# Patient Record
Sex: FEMALE | Race: WHITE | NOT HISPANIC OR LATINO
[De-identification: names, ages, dates, MRNs, and addresses within clinical notes are randomized per-mention and may not be internally consistent; named-entity substitution may affect disease eponyms.]

---

## 2018-04-25 ENCOUNTER — APPOINTMENT (OUTPATIENT)
Dept: CARDIOLOGY | Facility: CLINIC | Age: 83
End: 2018-04-25
Payer: MEDICARE

## 2018-04-25 ENCOUNTER — NON-APPOINTMENT (OUTPATIENT)
Age: 83
End: 2018-04-25

## 2018-04-25 VITALS
HEIGHT: 61 IN | SYSTOLIC BLOOD PRESSURE: 160 MMHG | OXYGEN SATURATION: 97 % | HEART RATE: 56 BPM | DIASTOLIC BLOOD PRESSURE: 80 MMHG | WEIGHT: 173 LBS | BODY MASS INDEX: 32.66 KG/M2

## 2018-04-25 DIAGNOSIS — R00.2 PALPITATIONS: ICD-10-CM

## 2018-04-25 DIAGNOSIS — I65.29 OCCLUSION AND STENOSIS OF UNSPECIFIED CAROTID ARTERY: ICD-10-CM

## 2018-04-25 DIAGNOSIS — R73.9 HYPERGLYCEMIA, UNSPECIFIED: ICD-10-CM

## 2018-04-25 DIAGNOSIS — E78.5 HYPERLIPIDEMIA, UNSPECIFIED: ICD-10-CM

## 2018-04-25 DIAGNOSIS — Z01.818 ENCOUNTER FOR OTHER PREPROCEDURAL EXAMINATION: ICD-10-CM

## 2018-04-25 DIAGNOSIS — I34.0 NONRHEUMATIC MITRAL (VALVE) INSUFFICIENCY: ICD-10-CM

## 2018-04-25 PROCEDURE — 99215 OFFICE O/P EST HI 40 MIN: CPT

## 2018-04-25 PROCEDURE — 93000 ELECTROCARDIOGRAM COMPLETE: CPT

## 2018-04-25 RX ORDER — HYDROCHLOROTHIAZIDE 12.5 MG/1
12.5 TABLET ORAL DAILY
Qty: 30 | Refills: 3 | Status: ACTIVE | COMMUNITY

## 2018-04-27 ENCOUNTER — APPOINTMENT (OUTPATIENT)
Dept: CARDIOLOGY | Facility: CLINIC | Age: 83
End: 2018-04-27
Payer: MEDICARE

## 2018-04-27 PROCEDURE — 93880 EXTRACRANIAL BILAT STUDY: CPT

## 2018-05-02 ENCOUNTER — EMERGENCY (EMERGENCY)
Facility: HOSPITAL | Age: 83
LOS: 1 days | Discharge: HOME | End: 2018-05-02
Attending: EMERGENCY MEDICINE | Admitting: EMERGENCY MEDICINE
Payer: MEDICARE

## 2018-05-02 VITALS
OXYGEN SATURATION: 97 % | DIASTOLIC BLOOD PRESSURE: 60 MMHG | WEIGHT: 169.98 LBS | HEART RATE: 86 BPM | RESPIRATION RATE: 18 BRPM | SYSTOLIC BLOOD PRESSURE: 150 MMHG | TEMPERATURE: 99 F | HEIGHT: 61 IN

## 2018-05-02 VITALS
TEMPERATURE: 99 F | HEART RATE: 63 BPM | DIASTOLIC BLOOD PRESSURE: 76 MMHG | OXYGEN SATURATION: 98 % | SYSTOLIC BLOOD PRESSURE: 143 MMHG | RESPIRATION RATE: 17 BRPM

## 2018-05-02 LAB
ALBUMIN SERPL ELPH-MCNC: 3.5 G/DL — SIGNIFICANT CHANGE UP (ref 3.3–5)
ALP SERPL-CCNC: 64 U/L — SIGNIFICANT CHANGE UP (ref 40–120)
ALT FLD-CCNC: 26 U/L — SIGNIFICANT CHANGE UP (ref 12–78)
AMYLASE P1 CFR SERPL: 56 U/L — SIGNIFICANT CHANGE UP (ref 25–115)
ANION GAP SERPL CALC-SCNC: 12 MMOL/L — SIGNIFICANT CHANGE UP (ref 5–17)
APPEARANCE UR: CLEAR — SIGNIFICANT CHANGE UP
APTT BLD: 24.7 SEC — LOW (ref 27.5–37.4)
AST SERPL-CCNC: 22 U/L — SIGNIFICANT CHANGE UP (ref 15–37)
BACTERIA # UR AUTO: NEGATIVE — SIGNIFICANT CHANGE UP
BASOPHILS NFR BLD AUTO: 1 % — SIGNIFICANT CHANGE UP (ref 0–2)
BILIRUB SERPL-MCNC: 0.5 MG/DL — SIGNIFICANT CHANGE UP (ref 0.2–1.2)
BILIRUB UR-MCNC: NEGATIVE — SIGNIFICANT CHANGE UP
BUN SERPL-MCNC: 28 MG/DL — HIGH (ref 7–23)
CALCIUM SERPL-MCNC: 9 MG/DL — SIGNIFICANT CHANGE UP (ref 8.5–10.1)
CHLORIDE SERPL-SCNC: 103 MMOL/L — SIGNIFICANT CHANGE UP (ref 96–108)
CO2 SERPL-SCNC: 24 MMOL/L — SIGNIFICANT CHANGE UP (ref 22–31)
COLOR SPEC: YELLOW — SIGNIFICANT CHANGE UP
CREAT SERPL-MCNC: 0.99 MG/DL — SIGNIFICANT CHANGE UP (ref 0.5–1.3)
DIFF PNL FLD: NEGATIVE — SIGNIFICANT CHANGE UP
EPI CELLS # UR: SIGNIFICANT CHANGE UP
GLUCOSE SERPL-MCNC: 151 MG/DL — HIGH (ref 70–99)
GLUCOSE UR QL: NEGATIVE — SIGNIFICANT CHANGE UP
HCT VFR BLD CALC: 40.8 % — SIGNIFICANT CHANGE UP (ref 34.5–45)
HCT VFR BLD CALC: 43.4 % — SIGNIFICANT CHANGE UP (ref 34.5–45)
HGB BLD-MCNC: 13.5 G/DL — SIGNIFICANT CHANGE UP (ref 11.5–15.5)
HGB BLD-MCNC: 14.8 G/DL — SIGNIFICANT CHANGE UP (ref 11.5–15.5)
INR BLD: 1.01 RATIO — SIGNIFICANT CHANGE UP (ref 0.88–1.16)
KETONES UR-MCNC: NEGATIVE — SIGNIFICANT CHANGE UP
LEUKOCYTE ESTERASE UR-ACNC: NEGATIVE — SIGNIFICANT CHANGE UP
LIDOCAIN IGE QN: 175 U/L — SIGNIFICANT CHANGE UP (ref 73–393)
LYMPHOCYTES # BLD AUTO: 16 % — SIGNIFICANT CHANGE UP (ref 13–44)
LYMPHOCYTES # BLD AUTO: 2 % — LOW (ref 13–44)
MCHC RBC-ENTMCNC: 32.9 PG — SIGNIFICANT CHANGE UP (ref 27–34)
MCHC RBC-ENTMCNC: 33.1 GM/DL — SIGNIFICANT CHANGE UP (ref 32–36)
MCHC RBC-ENTMCNC: 33.9 PG — SIGNIFICANT CHANGE UP (ref 27–34)
MCHC RBC-ENTMCNC: 34 GM/DL — SIGNIFICANT CHANGE UP (ref 32–36)
MCV RBC AUTO: 99.4 FL — SIGNIFICANT CHANGE UP (ref 80–100)
MCV RBC AUTO: 99.7 FL — SIGNIFICANT CHANGE UP (ref 80–100)
MONOCYTES NFR BLD AUTO: 2 % — SIGNIFICANT CHANGE UP (ref 1–9)
MONOCYTES NFR BLD AUTO: 9 % — SIGNIFICANT CHANGE UP (ref 1–9)
NEUTROPHILS NFR BLD AUTO: 64 % — SIGNIFICANT CHANGE UP (ref 43–77)
NEUTROPHILS NFR BLD AUTO: 83 % — HIGH (ref 43–77)
NEUTS BAND # BLD: 10 % — HIGH (ref 0–8)
NEUTS BAND # BLD: 7 % — SIGNIFICANT CHANGE UP (ref 0–8)
NITRITE UR-MCNC: NEGATIVE — SIGNIFICANT CHANGE UP
PH UR: 5 — SIGNIFICANT CHANGE UP (ref 5–8)
PLAT MORPH BLD: NORMAL — SIGNIFICANT CHANGE UP
PLAT MORPH BLD: NORMAL — SIGNIFICANT CHANGE UP
PLATELET # BLD AUTO: 268 K/UL — SIGNIFICANT CHANGE UP (ref 150–400)
PLATELET # BLD AUTO: 306 K/UL — SIGNIFICANT CHANGE UP (ref 150–400)
POTASSIUM SERPL-MCNC: 3.9 MMOL/L — SIGNIFICANT CHANGE UP (ref 3.5–5.3)
POTASSIUM SERPL-SCNC: 3.9 MMOL/L — SIGNIFICANT CHANGE UP (ref 3.5–5.3)
PROT SERPL-MCNC: 7.1 G/DL — SIGNIFICANT CHANGE UP (ref 6–8.3)
PROT UR-MCNC: NEGATIVE — SIGNIFICANT CHANGE UP
PROTHROM AB SERPL-ACNC: 11 SEC — SIGNIFICANT CHANGE UP (ref 9.8–12.7)
RBC # BLD: 4.1 M/UL — SIGNIFICANT CHANGE UP (ref 3.8–5.2)
RBC # BLD: 4.35 M/UL — SIGNIFICANT CHANGE UP (ref 3.8–5.2)
RBC # FLD: 11.1 % — SIGNIFICANT CHANGE UP (ref 10.3–14.5)
RBC # FLD: 11.6 % — SIGNIFICANT CHANGE UP (ref 10.3–14.5)
RBC BLD AUTO: NORMAL — SIGNIFICANT CHANGE UP
RBC BLD AUTO: NORMAL — SIGNIFICANT CHANGE UP
RBC CASTS # UR COMP ASSIST: SIGNIFICANT CHANGE UP /HPF (ref 0–4)
SODIUM SERPL-SCNC: 139 MMOL/L — SIGNIFICANT CHANGE UP (ref 135–145)
SP GR SPEC: 1.01 — SIGNIFICANT CHANGE UP (ref 1.01–1.02)
UROBILINOGEN FLD QL: NEGATIVE — SIGNIFICANT CHANGE UP
VARIANT LYMPHS # BLD: 2 % — SIGNIFICANT CHANGE UP (ref 0–6)
VARIANT LYMPHS # BLD: 4 % — SIGNIFICANT CHANGE UP (ref 0–6)
WBC # BLD: 13.4 K/UL — HIGH (ref 3.8–10.5)
WBC # BLD: 17.1 K/UL — HIGH (ref 3.8–10.5)
WBC # FLD AUTO: 13.4 K/UL — HIGH (ref 3.8–10.5)
WBC # FLD AUTO: 17.1 K/UL — HIGH (ref 3.8–10.5)
WBC UR QL: SIGNIFICANT CHANGE UP

## 2018-05-02 PROCEDURE — 96360 HYDRATION IV INFUSION INIT: CPT | Mod: XU

## 2018-05-02 PROCEDURE — 71045 X-RAY EXAM CHEST 1 VIEW: CPT

## 2018-05-02 PROCEDURE — 83690 ASSAY OF LIPASE: CPT

## 2018-05-02 PROCEDURE — 82550 ASSAY OF CK (CPK): CPT

## 2018-05-02 PROCEDURE — 80053 COMPREHEN METABOLIC PANEL: CPT

## 2018-05-02 PROCEDURE — 81001 URINALYSIS AUTO W/SCOPE: CPT

## 2018-05-02 PROCEDURE — 71045 X-RAY EXAM CHEST 1 VIEW: CPT | Mod: 26

## 2018-05-02 PROCEDURE — 96361 HYDRATE IV INFUSION ADD-ON: CPT

## 2018-05-02 PROCEDURE — 85610 PROTHROMBIN TIME: CPT

## 2018-05-02 PROCEDURE — 82553 CREATINE MB FRACTION: CPT

## 2018-05-02 PROCEDURE — 99285 EMERGENCY DEPT VISIT HI MDM: CPT

## 2018-05-02 PROCEDURE — 99284 EMERGENCY DEPT VISIT MOD MDM: CPT | Mod: 25

## 2018-05-02 PROCEDURE — 84484 ASSAY OF TROPONIN QUANT: CPT

## 2018-05-02 PROCEDURE — 85730 THROMBOPLASTIN TIME PARTIAL: CPT

## 2018-05-02 PROCEDURE — 85027 COMPLETE CBC AUTOMATED: CPT

## 2018-05-02 PROCEDURE — 74177 CT ABD & PELVIS W/CONTRAST: CPT

## 2018-05-02 PROCEDURE — 74177 CT ABD & PELVIS W/CONTRAST: CPT | Mod: 26

## 2018-05-02 PROCEDURE — 87086 URINE CULTURE/COLONY COUNT: CPT

## 2018-05-02 PROCEDURE — 93005 ELECTROCARDIOGRAM TRACING: CPT

## 2018-05-02 PROCEDURE — 82150 ASSAY OF AMYLASE: CPT

## 2018-05-02 RX ORDER — LEVOTHYROXINE SODIUM 125 MCG
1 TABLET ORAL
Qty: 0 | Refills: 0 | COMMUNITY

## 2018-05-02 RX ORDER — SODIUM CHLORIDE 9 MG/ML
1000 INJECTION INTRAMUSCULAR; INTRAVENOUS; SUBCUTANEOUS ONCE
Qty: 0 | Refills: 0 | Status: COMPLETED | OUTPATIENT
Start: 2018-05-02 | End: 2018-05-02

## 2018-05-02 RX ORDER — EZETIMIBE 10 MG/1
1 TABLET ORAL
Qty: 0 | Refills: 0 | COMMUNITY

## 2018-05-02 RX ORDER — ROSUVASTATIN CALCIUM 5 MG/1
2.5 TABLET ORAL
Qty: 0 | Refills: 0 | COMMUNITY

## 2018-05-02 RX ORDER — ATENOLOL 25 MG/1
1 TABLET ORAL
Qty: 0 | Refills: 0 | COMMUNITY

## 2018-05-02 RX ORDER — ASPIRIN/CALCIUM CARB/MAGNESIUM 324 MG
1 TABLET ORAL
Qty: 0 | Refills: 0 | COMMUNITY

## 2018-05-02 RX ORDER — RAMIPRIL 5 MG
1 CAPSULE ORAL
Qty: 0 | Refills: 0 | COMMUNITY

## 2018-05-02 RX ORDER — IOHEXOL 300 MG/ML
30 INJECTION, SOLUTION INTRAVENOUS ONCE
Qty: 0 | Refills: 0 | Status: COMPLETED | OUTPATIENT
Start: 2018-05-02 | End: 2018-05-02

## 2018-05-02 RX ADMIN — SODIUM CHLORIDE 1000 MILLILITER(S): 9 INJECTION INTRAMUSCULAR; INTRAVENOUS; SUBCUTANEOUS at 13:34

## 2018-05-02 RX ADMIN — IOHEXOL 30 MILLILITER(S): 300 INJECTION, SOLUTION INTRAVENOUS at 09:49

## 2018-05-02 RX ADMIN — SODIUM CHLORIDE 1000 MILLILITER(S): 9 INJECTION INTRAMUSCULAR; INTRAVENOUS; SUBCUTANEOUS at 09:43

## 2018-05-02 NOTE — ED PROVIDER NOTE - PROGRESS NOTE DETAILS
jena (jalen) cleared pt for d/c and outpt f/u. Reevaluated patient at bedside.  Patient feeling well.  Discussed the results of all diagnostic testing in ED and copies of all reports given.   An opportunity to ask questions was given.  Discussed the importance of prompt, close medical follow-up.  Patient will return with any changes, concerns or persistent / worsening symptoms.  Understanding of all instructions verbalized.

## 2018-05-02 NOTE — ED PROVIDER NOTE - OBJECTIVE STATEMENT
pt c/o nausea vomiting and diarrhea overnight, now resolved, and palpitations this am. pt was told by her cardiologist to come to er for palp. no fevers, chills, ha, dizziness, cp, sob, abd pain. no current symptoms.  travon - jaison paz

## 2018-05-02 NOTE — CONSULT NOTE ADULT - ASSESSMENT
83F w/pmh of Hypothyroid, HLD, HTN, hx of splenectomy presenting with palpitations which has resolved. Patient has had palpitations in the past which was worked up with Holter monitor without abnormal findings. Currently, asymptomatic. Found to have leukocytosis.     - Unclear etiology. With multiple episodes of diarrhea, palpitations may be due to electrolyte imbalance.   - Follow up CT A/P  - No clear evidence of acute ischemia. Awaiting trops  - No evidence of volume overload   - No acute changes on EKG compared to previous  - BP well controlled, continue home BP meds, monitor routine hemodynamics  - Monitor and replete lytes, keep K>4, Mg>2  - Other cardiovascular workup will depend on clinical course.  - All other workup per primary team  - Will follow 83F w/pmh of Hypothyroid, HLD, HTN, hx of splenectomy presenting with palpitations which has resolved. Patient has had palpitations in the past which was worked up with Holter monitor without abnormal findings. Currently, asymptomatic. Found to have leukocytosis.     - Unclear etiology. With multiple episodes of diarrhea and vomiting, palpitations may be due to electrolyte imbalance.   - Follow up CT A/P  - No clear evidence of acute ischemia. Awaiting trops  - No evidence of volume overload   - EKG showed Normal sinus without ST elevations/depressions  - BP well controlled, continue home BP meds, monitor routine hemodynamics  - Monitor and replete lytes, keep K>4, Mg>2  - Other cardiovascular workup will depend on clinical course.  - All other workup per primary team  - Will follow 83F w/pmh of Hypothyroid, HLD, HTN, hx of splenectomy presenting with palpitations which has resolved. Patient has had palpitations in the past which was worked up with Holter monitor without abnormal findings. Currently, asymptomatic. Found to have leukocytosis.     - Unclear etiology. With multiple episodes of diarrhea and vomiting, palpitations may be due to electrolyte imbalance.   - Follow up CT A/P  - No clear evidence of acute ischemia. Awaiting trops  - No evidence of volume overload   - EKG showed Normal sinus without ST elevations/depressions  - BP mildly elevated, continue home BP meds, monitor routine hemodynamics  - Monitor and replete lytes, keep K>4, Mg>2  - Other cardiovascular workup will depend on clinical course.  - All other workup per primary team  - Will follow 83F w/pmh of Hypothyroid, HLD, HTN, hx of splenectomy presenting with palpitations which has resolved. Patient has had palpitations in the past which was worked up with Holter monitor without abnormal findings. Currently, asymptomatic. Found to have leukocytosis.     - Unclear etiology. With multiple episodes of diarrhea and vomiting, palpitations may be due to electrolyte imbalance.   - Follow up CT A/P  - No clear evidence of acute ischemia. Trops negative x1.   - No evidence of volume overload   - EKG showed Normal sinus without ST elevations/depressions  - BP mildly elevated, continue home BP meds, monitor routine hemodynamics  - Monitor and replete lytes, keep K>4, Mg>2  - Other cardiovascular workup will depend on clinical course.  - All other workup per primary team  - Will follow 83F w/pmh of Hypothyroid, HLD, HTN, hx of splenectomy presenting with palpitations which has resolved. Patient has had palpitations in the past which was worked up with Holter monitor without abnormal findings. Currently, asymptomatic. Found to have leukocytosis.     - Unclear etiology. With multiple episodes of diarrhea and vomiting, palpitations may be due to electrolyte imbalance.   - Follow up CT A/P  - No clear evidence of acute ischemia. Trops negative x1.   - No evidence of volume overload   - EKG showed Normal sinus without ST elevations/depressions  - BP mildly elevated likely 2/2 increased salt intake, continue home BP meds, monitor routine hemodynamics  - Monitor and replete lytes, keep K>4, Mg>2  - Other cardiovascular workup will depend on clinical course.  - All other workup per primary team  - Will follow 83F w/pmh of Hypothyroid, HLD, HTN, hx of splenectomy presenting with palpitations which has resolved. Patient has had palpitations in the past which was worked up with Holter monitor without abnormal findings. Currently, asymptomatic. Found to have leukocytosis.     - her palpitations are of unclear etiology. She does not describe a sudden onset or offset, or sustained very rapid rates, all of which would be more suggestive of a true arrhythia.  Overall I am more suspicious of a reactive sinus tach related to hermultiple episodes of diarrhea and vomiting   - she has been found to have a leukocytosis  - Follow up CT A/P  - No clear evidence of acute ischemia. Trops negative x1.   - No evidence of volume overload   - EKG now shows Normal sinus without ST elevations/depressions  - BP mildly elevated likely 2/2 increased salt intake, continue home BP meds, monitor routine hemodynamics  - Monitor and replete lytes, keep K>4, Mg>2  - Other cardiovascular workup will depend on clinical course.  - if no meaningful abdominal pathology would be reasonable to dc home and followup as an outpt for all cv complaints

## 2018-05-02 NOTE — ED ADULT NURSE NOTE - OBJECTIVE STATEMENT
pt with complaints of nausea, vomiting and diarrhea from 1 am to 4 am. pt currently has no nausea, vomiting or diarrhea, or abdominal pain, does not have palpitations now.

## 2018-05-02 NOTE — CONSULT NOTE ADULT - SUBJECTIVE AND OBJECTIVE BOX
[facility] - Antoine, Sabrina, Jameel, Jovany, Sawyer, Robin Pagan  Office Number: [phone]    Initial Consult Note    CHIEF COMPLAINT: Patient is a 83y old  Female who presents with a chief complaint of palpitations.    HPI: 83F w/pmh of Hypothyroid, HLD, HTN, hx of splenectomy presenting with palpitations this morning. States that palpitations lasted 15 minutes after waking up this morning. She has had palpitations similar to this before which her Cardiologist, Dr. Schultz is aware of. She wore a holter monitor for a month with normal findings and was told to come to ED if she ever had palpitations again. She recently saw Dr. Schultz 2 weeks ago and had carotid doppler, she is due for ECHO tomorrow and stress test. States that she had 7-8 episodes of nonbloody diarrhea and 5 episodes of nbnb vomiting last night after dinner. Reports that she was nauseous and took pepto bismol which upset her stomach more. Patient also reports having chills last night. She made homemade chicken soup and eggplant parm for dinner. No one else had similar symptoms after eating the same food. She does report eating chocolate from December. Currently, palpitations have resolved. Patient no longer has n/v, chest pain, palpitations, sob, abdominal pain.     PAST MEDICAL & SURGICAL HISTORY:  Diabetes  Hyperlipidemia  Hypothyroidism  Hypertension  History of Splenectomy    SOCIAL HISTORY:  No tobacco, ethanol, or drug abuse.    FAMILY HISTORY:    No family history of acute MI or sudden cardiac death.    MEDICATIONS  (STANDING): Synthroid, Atenolol, HCTZ, Ramipril, Crestor, Zetia, Asa 81mg,     Allergies: No Known Allergies    REVIEW OF SYSTEMS:  CONSTITUTIONAL: No weakness, fevers or chills  EYES/ENT: No visual changes;  No vertigo or throat pain   NECK: No pain or stiffness  RESPIRATORY: No cough, wheezing, hemoptysis; No shortness of breath  CARDIOVASCULAR: No chest pain or palpitations  GASTROINTESTINAL: No abdominal pain. No nausea, vomiting, or hematemesis; No diarrhea or constipation. No melena or hematochezia.  GENITOURINARY: No dysuria, frequency or hematuria  NEUROLOGICAL: No numbness or weakness  SKIN: No itching or rash  All other review of systems is negative unless indicated above    VITAL SIGNS:   Vital Signs Last 24 Hrs  T(C): 37 (02 May 2018 08:55), Max: 37 (02 May 2018 08:55)  T(F): 98.6 (02 May 2018 08:55), Max: 98.6 (02 May 2018 08:55)  HR: 86 (02 May 2018 08:55) (86 - 86)  BP: 150/60 (02 May 2018 08:55) (150/60 - 150/60)  RR: 18 (02 May 2018 08:55) (18 - 18)  SpO2: 97% (02 May 2018 08:55) (97% - 97%)    On Exam:  Constitutional: NAD, alert and oriented x 3  Lungs:  Non-labored, breath sounds are clear bilaterally, No wheezing, rales or rhonchi  Cardiovascular: RRR.  S1 and S2 positive.  No murmurs, rubs, gallops or clicks  Gastrointestinal: soft, nontender, nondistended, no guarding, no rebound, negative wong sign, negative CVA tenderness  Lymph: No peripheral edema. No cervical lymphadenopathy.  Neurological: Alert, no focal deficits  Skin: No rashes or ulcers   Psych:  Mood & affect appropriate.    LABS: All Labs Reviewed:                       14.8   17.1  )-----------( 306      ( 02 May 2018 09:26 )             43.4     02 May 2018 09:26    139    |  103    |  28     ----------------------------<  151    3.9     |  24     |  0.99     Ca    9.0        02 May 2018 09:26    TPro  7.1    /  Alb  3.5    /  TBili  0.5    /  DBili  x      /  AST  22     /  ALT  26     /  AlkPhos  64     02 May 2018 09:26    LIVER FUNCTIONS - ( 02 May 2018 09:26 )  Alb: 3.5 g/dL / Pro: 7.1 g/dL / ALK PHOS: 64 U/L / ALT: 26 U/L / AST: 22 U/L / GGT: x           RADIOLOGY:  < from: Xray Chest 1 View- PORTABLE-Routine (05.02.18 @ 09:38) >   No active disease.    EKG: NSR, HR 80, No ST elevation/depression [facility] - Antoine, Sabrina, Jameel, Jovany, Sawyer, Robin Pagan  Office Number: [phone]    Initial Consult Note    CHIEF COMPLAINT: Patient is a 83y old  Female who presents with a chief complaint of palpitations.    HPI: 83F w/pmh of Hypothyroid, HLD, HTN, hx of splenectomy presenting with palpitations this morning. States that palpitations lasted 15 minutes after waking up this morning. She has had palpitations similar to this before which her Cardiologist, Dr. Schultz is aware of. She wore a holter monitor for a month with normal findings and was told to come to ED if she ever had palpitations again. She recently saw Dr. Schultz 2 weeks ago and had carotid doppler, she is due for ECHO tomorrow and stress test. States that she had 7-8 episodes of nonbloody diarrhea and 5 episodes of nbnb vomiting last night after dinner. Reports that she was nauseous and took pepto bismol which upset her stomach more. Patient also reports having chills last night. She made homemade chicken soup and eggplant parm for dinner. No one else had similar symptoms after eating the same food. She does report eating chocolate from December. Currently, palpitations have resolved. Patient no longer has n/v, chest pain, palpitations, sob, abdominal pain. Of note, patient returned from [place] on 4/23.     PAST MEDICAL & SURGICAL HISTORY:  Diabetes  Hyperlipidemia  Hypothyroidism  Hypertension  History of Splenectomy    SOCIAL HISTORY:  No tobacco, ethanol, or drug abuse.    FAMILY HISTORY:    No family history of acute MI or sudden cardiac death.    MEDICATIONS  (STANDING): Synthroid, Atenolol, HCTZ, Ramipril, Crestor, Zetia, Asa 81mg,     Allergies: No Known Allergies    REVIEW OF SYSTEMS:  CONSTITUTIONAL: No weakness, fevers or chills  EYES/ENT: No visual changes;  No vertigo or throat pain   NECK: No pain or stiffness  RESPIRATORY: No cough, wheezing, hemoptysis; No shortness of breath  CARDIOVASCULAR: No chest pain or palpitations  GASTROINTESTINAL: No abdominal pain. No nausea, vomiting, or hematemesis; No diarrhea or constipation. No melena or hematochezia.  GENITOURINARY: No dysuria, frequency or hematuria  NEUROLOGICAL: No numbness or weakness  SKIN: No itching or rash  All other review of systems is negative unless indicated above    VITAL SIGNS:   Vital Signs Last 24 Hrs  T(C): 37 (02 May 2018 08:55), Max: 37 (02 May 2018 08:55)  T(F): 98.6 (02 May 2018 08:55), Max: 98.6 (02 May 2018 08:55)  HR: 86 (02 May 2018 08:55) (86 - 86)  BP: 150/60 (02 May 2018 08:55) (150/60 - 150/60)  RR: 18 (02 May 2018 08:55) (18 - 18)  SpO2: 97% (02 May 2018 08:55) (97% - 97%)    On Exam:  Constitutional: NAD, alert and oriented x 3  Lungs:  Non-labored, breath sounds are clear bilaterally, No wheezing, rales or rhonchi  Cardiovascular: RRR.  S1 and S2 positive.  No murmurs, rubs, gallops or clicks  Gastrointestinal: soft, nontender, nondistended, no guarding, no rebound, negative wong sign, negative CVA tenderness  Lymph: No peripheral edema. No cervical lymphadenopathy.  Neurological: Alert, no focal deficits  Skin: No rashes or ulcers   Psych:  Mood & affect appropriate.    LABS: All Labs Reviewed:                       14.8   17.1  )-----------( 306      ( 02 May 2018 09:26 )             43.4     02 May 2018 09:26    139    |  103    |  28     ----------------------------<  151    3.9     |  24     |  0.99     Ca    9.0        02 May 2018 09:26    TPro  7.1    /  Alb  3.5    /  TBili  0.5    /  DBili  x      /  AST  22     /  ALT  26     /  AlkPhos  64     02 May 2018 09:26    LIVER FUNCTIONS - ( 02 May 2018 09:26 )  Alb: 3.5 g/dL / Pro: 7.1 g/dL / ALK PHOS: 64 U/L / ALT: 26 U/L / AST: 22 U/L / GGT: x           RADIOLOGY:  < from: Xray Chest 1 View- PORTABLE-Routine (05.02.18 @ 09:38) >   No active disease.    EKG: NSR, HR 80, No ST elevation/depression [facility] - Sabrina Schultz, Jameel, Jovany, Sawyer, Robin Pagan  Office Number: [phone]    Initial Consult Note    CHIEF COMPLAINT: Patient is a 83y old  Female who presents with a chief complaint of palpitations.    HPI: 83F w/pmh of Hypothyroid, HLD, HTN, hx of splenectomy presenting with palpitations this morning. States that palpitations lasted 15 minutes after waking up this morning. She further describes these as a sense that her heart was beating more quickly than usual.  She took her bloodpressure a few times and found heart rates initially in the 110-115 range, and later in the 105-110 range.  She became concerned, and noted that she had been told to take such sxs seriously in the past.  She has had palpitations similar to this before which her Cardiologist, Dr. Schultz is aware of. She wore a monitor for a month with normal findings and was told to come to ED if she ever had palpitations again. She recently saw Dr. Schultz 2 weeks ago and had carotid doppler, she is due for ECHO tomorrow and stress test. States that she had 7-8 episodes of nonbloody diarrhea and 5 episodes of nbnb vomiting last night after dinner. Reports that she was nauseous and took pepto bismol which upset her stomach more. Patient also reports having chills last night. She made homemade chicken soup and eggplant parm for dinner. No one else had similar symptoms after eating the same food. She does report eating chocolate from December. Currently, palpitations have resolved. Patient no longer has n/v, chest pain, palpitations, sob, abdominal pain. Of note, patient returned from [place] on 4/23.     PAST MEDICAL & SURGICAL HISTORY:  Diabetes  Hyperlipidemia  Hypothyroidism  Hypertension  History of Splenectomy    SOCIAL HISTORY:  No tobacco, ethanol, or drug abuse.    FAMILY HISTORY:    No family history of acute MI or sudden cardiac death.    MEDICATIONS  (STANDING): Synthroid, Atenolol, HCTZ, Ramipril, Crestor, Zetia, Asa 81mg,     Allergies: No Known Allergies    REVIEW OF SYSTEMS:  CONSTITUTIONAL: No weakness, fevers or chills  EYES/ENT: No visual changes;  No vertigo or throat pain   NECK: No pain or stiffness  RESPIRATORY: No cough, wheezing, hemoptysis; No shortness of breath  CARDIOVASCULAR: No chest pain or palpitations  GASTROINTESTINAL: No abdominal pain. No nausea, vomiting, or hematemesis; No diarrhea or constipation. No melena or hematochezia.  GENITOURINARY: No dysuria, frequency or hematuria  NEUROLOGICAL: No numbness or weakness  SKIN: No itching or rash  All other review of systems is negative unless indicated above    VITAL SIGNS:   Vital Signs Last 24 Hrs  T(C): 37 (02 May 2018 08:55), Max: 37 (02 May 2018 08:55)  T(F): 98.6 (02 May 2018 08:55), Max: 98.6 (02 May 2018 08:55)  HR: 86 (02 May 2018 08:55) (86 - 86)  BP: 150/60 (02 May 2018 08:55) (150/60 - 150/60)  RR: 18 (02 May 2018 08:55) (18 - 18)  SpO2: 97% (02 May 2018 08:55) (97% - 97%)    On Exam:  Constitutional: NAD, alert and oriented x 3  Lungs:  Non-labored, breath sounds are clear bilaterally, No wheezing, rales or rhonchi  Cardiovascular: RRR.  S1 and S2 positive.  No murmurs, rubs, gallops or clicks  Gastrointestinal: soft, nontender, nondistended, no guarding, no rebound, negative wong sign, negative CVA tenderness  Lymph: No peripheral edema. No cervical lymphadenopathy.  Neurological: Alert, no focal deficits  Skin: No rashes or ulcers   Psych:  Mood & affect appropriate.    LABS: All Labs Reviewed:                       14.8   17.1  )-----------( 306      ( 02 May 2018 09:26 )             43.4     02 May 2018 09:26    139    |  103    |  28     ----------------------------<  151    3.9     |  24     |  0.99     Ca    9.0        02 May 2018 09:26    TPro  7.1    /  Alb  3.5    /  TBili  0.5    /  DBili  x      /  AST  22     /  ALT  26     /  AlkPhos  64     02 May 2018 09:26    LIVER FUNCTIONS - ( 02 May 2018 09:26 )  Alb: 3.5 g/dL / Pro: 7.1 g/dL / ALK PHOS: 64 U/L / ALT: 26 U/L / AST: 22 U/L / GGT: x           RADIOLOGY:  < from: Xray Chest 1 View- PORTABLE-Routine (05.02.18 @ 09:38) >   No active disease.    EKG: NSR, HR 80, No ST elevation/depression

## 2018-05-02 NOTE — CONSULT NOTE ADULT - ATTENDING COMMENTS
The patient was personally seen and examined, in addition to being examined and evaluated by housestaff.  All elements of the note were edited where appropriate.

## 2018-05-02 NOTE — ED ADULT NURSE NOTE - PMH
Diabetes    Hyperlipidemia    Hypertension    Hypothyroidism Diabetes    Diverticulitis    Hyperlipidemia    Hypertension    Hypothyroidism

## 2018-05-03 ENCOUNTER — APPOINTMENT (OUTPATIENT)
Dept: CARDIOLOGY | Facility: CLINIC | Age: 83
End: 2018-05-03
Payer: MEDICARE

## 2018-05-03 LAB
CULTURE RESULTS: SIGNIFICANT CHANGE UP
SPECIMEN SOURCE: SIGNIFICANT CHANGE UP

## 2018-05-03 PROCEDURE — 93306 TTE W/DOPPLER COMPLETE: CPT

## 2018-05-09 ENCOUNTER — APPOINTMENT (OUTPATIENT)
Dept: CARDIOLOGY | Facility: CLINIC | Age: 83
End: 2018-05-09
Payer: MEDICARE

## 2018-05-09 PROCEDURE — 78452 HT MUSCLE IMAGE SPECT MULT: CPT

## 2018-05-09 PROCEDURE — 93015 CV STRESS TEST SUPVJ I&R: CPT

## 2018-05-09 PROCEDURE — A9500: CPT

## 2018-05-11 ENCOUNTER — TRANSCRIPTION ENCOUNTER (OUTPATIENT)
Age: 83
End: 2018-05-11

## 2018-05-18 ENCOUNTER — OUTPATIENT (OUTPATIENT)
Dept: OUTPATIENT SERVICES | Facility: HOSPITAL | Age: 83
LOS: 1 days | End: 2018-05-18

## 2018-05-29 ENCOUNTER — INPATIENT (INPATIENT)
Facility: HOSPITAL | Age: 83
LOS: 2 days | Discharge: NURSING, REHAB OR LONG-TERM CARE | End: 2018-06-01
Payer: MEDICARE

## 2018-05-29 ENCOUNTER — OUTPATIENT (OUTPATIENT)
Dept: OUTPATIENT SERVICES | Facility: HOSPITAL | Age: 83
LOS: 1 days | End: 2018-05-29

## 2018-05-29 PROCEDURE — 73560 X-RAY EXAM OF KNEE 1 OR 2: CPT | Mod: 26,RT

## 2018-05-30 ENCOUNTER — OUTPATIENT (OUTPATIENT)
Dept: OUTPATIENT SERVICES | Facility: HOSPITAL | Age: 83
LOS: 1 days | End: 2018-05-30

## 2018-05-31 ENCOUNTER — OUTPATIENT (OUTPATIENT)
Dept: OUTPATIENT SERVICES | Facility: HOSPITAL | Age: 83
LOS: 1 days | End: 2018-05-31

## 2018-06-01 ENCOUNTER — OUTPATIENT (OUTPATIENT)
Dept: OUTPATIENT SERVICES | Facility: HOSPITAL | Age: 83
LOS: 1 days | End: 2018-06-01

## 2018-11-02 ENCOUNTER — TRANSCRIPTION ENCOUNTER (OUTPATIENT)
Age: 83
End: 2018-11-02

## 2019-11-22 ENCOUNTER — TRANSCRIPTION ENCOUNTER (OUTPATIENT)
Age: 84
End: 2019-11-22

## 2020-08-27 NOTE — ED PROVIDER NOTE - PRINCIPAL DIAGNOSIS
How Severe Is Your Skin Lesion?: mild
Has Your Skin Lesion Been Treated?: not been treated
Is This A New Presentation, Or A Follow-Up?: Skin Lesion
Palpitations

## 2022-01-23 PROBLEM — K57.92 DIVERTICULITIS OF INTESTINE, PART UNSPECIFIED, WITHOUT PERFORATION OR ABSCESS WITHOUT BLEEDING: Chronic | Status: ACTIVE | Noted: 2018-05-02

## 2023-05-03 ENCOUNTER — APPOINTMENT (OUTPATIENT)
Dept: CARDIOLOGY | Facility: CLINIC | Age: 88
End: 2023-05-03
Payer: MEDICARE

## 2023-05-03 ENCOUNTER — NON-APPOINTMENT (OUTPATIENT)
Age: 88
End: 2023-05-03

## 2023-05-03 VITALS
DIASTOLIC BLOOD PRESSURE: 73 MMHG | WEIGHT: 184 LBS | BODY MASS INDEX: 34.74 KG/M2 | SYSTOLIC BLOOD PRESSURE: 139 MMHG | HEIGHT: 61 IN | OXYGEN SATURATION: 96 % | HEART RATE: 56 BPM

## 2023-05-03 DIAGNOSIS — R00.2 PALPITATIONS: ICD-10-CM

## 2023-05-03 DIAGNOSIS — I10 ESSENTIAL (PRIMARY) HYPERTENSION: ICD-10-CM

## 2023-05-03 PROCEDURE — 93000 ELECTROCARDIOGRAM COMPLETE: CPT

## 2023-05-03 PROCEDURE — 99204 OFFICE O/P NEW MOD 45 MIN: CPT

## 2023-05-03 NOTE — HISTORY OF PRESENT ILLNESS
[FreeTextEntry1] : Mandy is a very pleasant 88-year-old female who has history of dyslipidemia, hypertension, osteoarthritis, prior hysterectomy and previous splenectomy after colonoscopy . \par \par She was last seen by Dr. Schultz in 2018, prior to a hip replacement.\par \par She had a full battery of cardiac testing in 2018.  A carotid Doppler demonstrated mild to moderate plaque in her left internal carotid artery.  An echocardiogram demonstrated normal left ventricular systolic function without significant valvular pathology.  A pharmacologic stress test showed no evidence of ischemia.\par \par Today, she is here to reestablish care.\par Overall, she is doing quite well.  She has no chest pain or dyspnea, and tries to stay active.  She does report palpitations, which occur at night, every 4 months or so, during which she feels that her heart is racing.  She recorded her blood pressure and heart rate once during the event, and her heart rate was in the 150 range.

## 2023-05-03 NOTE — DISCUSSION/SUMMARY
[FreeTextEntry1] : Mandy is doing quite well, and trying to stay active.  She reports palpitations which have been occurring every few months, and which sound to be an SVT based on description.  Her blood pressure is at goal today and physical exam is unremarkable.  EKG demonstrates sinus rhythm, with a single APC.\par \par We will start with a precautionary 2D echocardiogram to confirm the absence of structural heart disease.  She will be set up for a 2-week monitor, to elucidate the etiology of her symptoms.  She will have a carotid Doppler to evaluate her previously seen atherosclerosis.\par She will start taking her atenolol at night, to see if it helps with symptoms.\par \par We will speak after the above testing, and arrange follow-up [EKG obtained to assist in diagnosis and management of assessed problem(s)] : EKG obtained to assist in diagnosis and management of assessed problem(s)

## 2023-05-15 ENCOUNTER — APPOINTMENT (OUTPATIENT)
Dept: CARDIOLOGY | Facility: CLINIC | Age: 88
End: 2023-05-15
Payer: MEDICARE

## 2023-05-15 PROCEDURE — 93880 EXTRACRANIAL BILAT STUDY: CPT

## 2023-05-15 PROCEDURE — 93306 TTE W/DOPPLER COMPLETE: CPT

## 2023-08-30 ENCOUNTER — APPOINTMENT (OUTPATIENT)
Dept: OTOLARYNGOLOGY | Facility: CLINIC | Age: 88
End: 2023-08-30
Payer: MEDICARE

## 2023-08-30 ENCOUNTER — NON-APPOINTMENT (OUTPATIENT)
Age: 88
End: 2023-08-30

## 2023-08-30 VITALS
HEIGHT: 61 IN | BODY MASS INDEX: 34.93 KG/M2 | DIASTOLIC BLOOD PRESSURE: 74 MMHG | WEIGHT: 185 LBS | HEART RATE: 54 BPM | SYSTOLIC BLOOD PRESSURE: 147 MMHG

## 2023-08-30 DIAGNOSIS — R26.89 OTHER ABNORMALITIES OF GAIT AND MOBILITY: ICD-10-CM

## 2023-08-30 DIAGNOSIS — H93.13 TINNITUS, BILATERAL: ICD-10-CM

## 2023-08-30 DIAGNOSIS — Z87.19 PERSONAL HISTORY OF OTHER DISEASES OF THE DIGESTIVE SYSTEM: ICD-10-CM

## 2023-08-30 DIAGNOSIS — Z83.3 FAMILY HISTORY OF DIABETES MELLITUS: ICD-10-CM

## 2023-08-30 DIAGNOSIS — R42 DIZZINESS AND GIDDINESS: ICD-10-CM

## 2023-08-30 DIAGNOSIS — K21.9 GASTRO-ESOPHAGEAL REFLUX DISEASE W/OUT ESOPHAGITIS: ICD-10-CM

## 2023-08-30 DIAGNOSIS — Z82.49 FAMILY HISTORY OF ISCHEMIC HEART DISEASE AND OTHER DISEASES OF THE CIRCULATORY SYSTEM: ICD-10-CM

## 2023-08-30 DIAGNOSIS — H91.21 SUDDEN IDIOPATHIC HEARING LOSS, RIGHT EAR: ICD-10-CM

## 2023-08-30 PROCEDURE — 92550 TYMPANOMETRY & REFLEX THRESH: CPT

## 2023-08-30 PROCEDURE — 92557 COMPREHENSIVE HEARING TEST: CPT

## 2023-08-30 PROCEDURE — 99204 OFFICE O/P NEW MOD 45 MIN: CPT

## 2023-08-30 RX ORDER — AMLODIPINE BESYLATE 5 MG/1
TABLET ORAL
Refills: 0 | Status: ACTIVE | COMMUNITY

## 2023-08-30 RX ORDER — VIBEGRON 75 MG/1
TABLET, FILM COATED ORAL
Refills: 0 | Status: ACTIVE | COMMUNITY

## 2023-08-30 RX ORDER — CRANBERRY FRUIT EXTRACT 650 MG
CAPSULE ORAL
Refills: 0 | Status: ACTIVE | COMMUNITY

## 2023-08-30 NOTE — END OF VISIT
[FreeTextEntry3] : "I, Stevan Emery, personally scribed the services dictated to me by Dr. Farrukh Egan MD in this documentation on 08/30/2023 "   "I Dr. Farrukh Egan MD, personally performed the services described in this documentation on 08/30/2023 for the patient as scribed by Stevan Emery in my presence. I have reviewed and verified that all the information is accurate and true."

## 2023-08-30 NOTE — HISTORY OF PRESENT ILLNESS
[de-identified] : JADEN ESPINOZA is a 88 year old F who presents today for Hearing loss, ringing, and dizziness. Pt was on Pepcid recently.

## 2023-08-30 NOTE — CONSULT LETTER
[Dear  ___] : Dear  [unfilled], [Consult Closing:] : Thank you very much for allowing me to participate in the care of this patient.  If you have any questions, please do not hesitate to contact me. [FreeTextEntry3] : Farrukh Egan., MD

## 2023-08-30 NOTE — PHYSICAL EXAM
[Midline] : trachea located in midline position [Normal] : no nystagmus [] : Romberg test is negative [de-identified] : but still very unsteady

## 2023-08-30 NOTE — REVIEW OF SYSTEMS
[Hearing Loss] : hearing loss [Dizziness] : dizziness [Vertigo] : vertigo [Lightheadedness] : lightheadedness [Hoarseness] : hoarseness [Eyes Itch] : itching of the eyes [Palpitations] : palpitations [Heartburn] : heartburn [Joint Pain] : joint pain [Anxiety] : anxiety [Easy Bruising] : a tendency for easy bruising [Negative] : Endocrine [FreeTextEntry3] : watery [FreeTextEntry7] : difficulty swallowing, reflux  [FreeTextEntry1] : daytime sleepiness, feel cooler than others,

## 2023-08-30 NOTE — ASSESSMENT
[FreeTextEntry1] : Reviewed and reconciled medications, allergies, PMHx, PSHx, SocHx, FMHx.     physical exam:   No lateralization tinnitus  Oral: normal  No nystagmus  Negative head roll  negative Romberg but very unsteady   audio: -TYMPS: TYPE As AU -ESSENTIALLY MILD SLOPING TO SEVERE SNHL [id number] HZ AU    Plan:  VNG Audio - results interpreted by Farrukh Egan MD FACS and reviewed with the patient.

## 2023-09-15 ENCOUNTER — APPOINTMENT (OUTPATIENT)
Dept: CARDIOLOGY | Facility: CLINIC | Age: 88
End: 2023-09-15

## 2023-10-10 ENCOUNTER — APPOINTMENT (OUTPATIENT)
Dept: OTOLARYNGOLOGY | Facility: CLINIC | Age: 88
End: 2023-10-10
Payer: MEDICARE

## 2023-10-10 PROCEDURE — 92540 BASIC VESTIBULAR EVALUATION: CPT

## 2023-10-10 PROCEDURE — 92537 CALORIC VSTBLR TEST W/REC: CPT

## 2024-06-05 ENCOUNTER — NON-APPOINTMENT (OUTPATIENT)
Age: 89
End: 2024-06-05

## 2024-06-05 ENCOUNTER — OUTPATIENT (OUTPATIENT)
Dept: OUTPATIENT SERVICES | Facility: HOSPITAL | Age: 89
LOS: 1 days | End: 2024-06-05
Payer: MEDICARE

## 2024-06-05 ENCOUNTER — RESULT REVIEW (OUTPATIENT)
Age: 89
End: 2024-06-05

## 2024-06-05 DIAGNOSIS — R93.3 ABNORMAL FINDINGS ON DIAGNOSTIC IMAGING OF OTHER PARTS OF DIGESTIVE TRACT: ICD-10-CM

## 2024-06-05 PROCEDURE — 74220 X-RAY XM ESOPHAGUS 1CNTRST: CPT

## 2024-06-05 PROCEDURE — 92611 MOTION FLUOROSCOPY/SWALLOW: CPT

## 2024-06-05 PROCEDURE — 74230 X-RAY XM SWLNG FUNCJ C+: CPT

## 2024-06-05 PROCEDURE — 74230 X-RAY XM SWLNG FUNCJ C+: CPT | Mod: 26

## 2024-06-18 ENCOUNTER — APPOINTMENT (OUTPATIENT)
Dept: OTOLARYNGOLOGY | Facility: CLINIC | Age: 89
End: 2024-06-18
Payer: MEDICARE

## 2024-06-18 PROCEDURE — 92526 ORAL FUNCTION THERAPY: CPT | Mod: GN

## 2024-06-25 ENCOUNTER — APPOINTMENT (OUTPATIENT)
Dept: OTOLARYNGOLOGY | Facility: CLINIC | Age: 89
End: 2024-06-25
Payer: MEDICARE

## 2024-06-25 PROCEDURE — 92526 ORAL FUNCTION THERAPY: CPT | Mod: GN

## 2024-07-02 ENCOUNTER — APPOINTMENT (OUTPATIENT)
Dept: OTOLARYNGOLOGY | Facility: CLINIC | Age: 89
End: 2024-07-02
Payer: MEDICARE

## 2024-07-02 PROCEDURE — 92526 ORAL FUNCTION THERAPY: CPT | Mod: GN

## 2024-07-15 ENCOUNTER — TRANSCRIPTION ENCOUNTER (OUTPATIENT)
Age: 89
End: 2024-07-15

## 2024-07-16 ENCOUNTER — OUTPATIENT (OUTPATIENT)
Dept: OUTPATIENT SERVICES | Facility: HOSPITAL | Age: 89
LOS: 1 days | End: 2024-07-16
Payer: MEDICARE

## 2024-07-16 ENCOUNTER — RESULT REVIEW (OUTPATIENT)
Age: 89
End: 2024-07-16

## 2024-07-16 DIAGNOSIS — R13.10 DYSPHAGIA, UNSPECIFIED: ICD-10-CM

## 2024-07-16 PROCEDURE — 88305 TISSUE EXAM BY PATHOLOGIST: CPT | Mod: 26

## 2024-07-16 PROCEDURE — 88305 TISSUE EXAM BY PATHOLOGIST: CPT

## 2024-07-16 PROCEDURE — 88312 SPECIAL STAINS GROUP 1: CPT

## 2024-07-16 PROCEDURE — 88313 SPECIAL STAINS GROUP 2: CPT | Mod: 26

## 2024-07-16 PROCEDURE — 43239 EGD BIOPSY SINGLE/MULTIPLE: CPT

## 2024-07-16 PROCEDURE — 88312 SPECIAL STAINS GROUP 1: CPT | Mod: 26

## 2024-07-16 PROCEDURE — 88313 SPECIAL STAINS GROUP 2: CPT

## 2024-07-16 PROCEDURE — C1726: CPT

## 2024-07-16 DEVICE — ESOPHAGEAL BALLOON CATH CRE FIXED WIRE 18-19-20MM: Type: IMPLANTABLE DEVICE | Status: FUNCTIONAL

## 2024-07-16 DEVICE — HEMOSPRAY HEMOSTAT ENDO 7F: Type: IMPLANTABLE DEVICE | Status: FUNCTIONAL

## 2024-07-16 DEVICE — ESOPHAGEAL BALLOON CATH CRE FIXED WIRE 6-7-8MM: Type: IMPLANTABLE DEVICE | Status: FUNCTIONAL

## 2024-07-18 LAB — SURGICAL PATHOLOGY STUDY: SIGNIFICANT CHANGE UP

## 2024-09-20 ENCOUNTER — APPOINTMENT (OUTPATIENT)
Dept: CARDIOLOGY | Facility: CLINIC | Age: 89
End: 2024-09-20
Payer: MEDICARE

## 2024-09-20 ENCOUNTER — NON-APPOINTMENT (OUTPATIENT)
Age: 89
End: 2024-09-20

## 2024-09-20 VITALS
HEIGHT: 61 IN | BODY MASS INDEX: 35.12 KG/M2 | DIASTOLIC BLOOD PRESSURE: 71 MMHG | OXYGEN SATURATION: 96 % | WEIGHT: 186 LBS | HEART RATE: 62 BPM | SYSTOLIC BLOOD PRESSURE: 126 MMHG

## 2024-09-20 DIAGNOSIS — I34.0 NONRHEUMATIC MITRAL (VALVE) INSUFFICIENCY: ICD-10-CM

## 2024-09-20 DIAGNOSIS — E78.5 HYPERLIPIDEMIA, UNSPECIFIED: ICD-10-CM

## 2024-09-20 DIAGNOSIS — I10 ESSENTIAL (PRIMARY) HYPERTENSION: ICD-10-CM

## 2024-09-20 DIAGNOSIS — I65.29 OCCLUSION AND STENOSIS OF UNSPECIFIED CAROTID ARTERY: ICD-10-CM

## 2024-09-20 PROCEDURE — 93000 ELECTROCARDIOGRAM COMPLETE: CPT

## 2024-09-20 PROCEDURE — 99214 OFFICE O/P EST MOD 30 MIN: CPT

## 2024-09-22 NOTE — HISTORY OF PRESENT ILLNESS
[FreeTextEntry1] : Mandy is a very pleasant 89-year-old female, formerly followed by Dr Schultz, who has history of dyslipidemia, hypertension, osteoarthritis, prior hysterectomy and previous splenectomy after colonoscopy . S/P hip replacement.  She had a full battery of cardiac testing in 2018.  A carotid Doppler demonstrated mild to moderate plaque in her left internal carotid artery.  An echocardiogram demonstrated normal left ventricular systolic function without significant valvular pathology.  A pharmacologic stress test showed no evidence of ischemia.  Overall, she is doing quite well.  She has no chest pain or dyspnea, and tries to stay active. She has gained some weight due to dietary indiscretions but is cutting back.

## 2024-09-22 NOTE — DISCUSSION/SUMMARY
[EKG obtained to assist in diagnosis and management of assessed problem(s)] : EKG obtained to assist in diagnosis and management of assessed problem(s) [FreeTextEntry1] : Mandy is doing well, and trying to stay active.   Her blood pressure is at goal today and physical exam is unremarkable.  EKG demonstrates sinus rhythm she will under precautionary 2D echocardiogram to assess her mild-mod MR for progression. She will have a carotid Doppler to evaluate for progression of her previously seen atherosclerosis. She will continue her current regimen for B/P control with amlodipine 5mg, ramipril 10mg and atenolol 50mg at night.  She is due for annual follow up , she will have her lipid panel updated, she will have the results sent to this office. she will continue crestor 5mg and zetia 10mg every other day for now, will see what her LDL is at her next B/W.  We will speak after the above testing, and arrange follow-up

## 2024-09-22 NOTE — CARDIOLOGY SUMMARY
[de-identified] :  Sinus rhythm  [de-identified] : 2018 LVEF 70%, pharm breast artifact  [de-identified] : 5/2023 LVEF 63%, NML LV mass, mild-mod MR [de-identified] : 5/2023 mild-moderate

## 2024-09-22 NOTE — CARDIOLOGY SUMMARY
[de-identified] :  Sinus rhythm  [de-identified] : 2018 LVEF 70%, pharm breast artifact  [de-identified] : 5/2023 LVEF 63%, NML LV mass, mild-mod MR [de-identified] : 5/2023 mild-moderate

## 2024-09-27 ENCOUNTER — APPOINTMENT (OUTPATIENT)
Dept: CARDIOLOGY | Facility: CLINIC | Age: 89
End: 2024-09-27
Payer: MEDICARE

## 2024-09-27 PROCEDURE — 93880 EXTRACRANIAL BILAT STUDY: CPT

## 2024-09-27 PROCEDURE — 93306 TTE W/DOPPLER COMPLETE: CPT

## (undated) DEVICE — BRUSH COLONOSCOPY CYTOLOGY

## (undated) DEVICE — TUBE O2 SUPL CRUSH RESIS CONN SOUTHSIDE ONLY

## (undated) DEVICE — SYR ALLIANCE II INFLATION 60ML

## (undated) DEVICE — TUBING IV SET GRAVITY 3Y 100" MACRO

## (undated) DEVICE — SOL IRR NS 0.9% 250ML

## (undated) DEVICE — BITE BLOCK MAXI RUBBER STAMP

## (undated) DEVICE — SENSOR O2 FINGER XL ADULT 24/BX 6BX/CA

## (undated) DEVICE — VALVE BIOPSY

## (undated) DEVICE — CANISTER SUCTION 1200CC 10/SL

## (undated) DEVICE — RADIAL JAW 4 LG CAPACITY WITH NDL

## (undated) DEVICE — SYR IV POSIFLUSH NS 3ML 30/TY

## (undated) DEVICE — CATH ELCTR GLIDE PRB 7FR

## (undated) DEVICE — CATH IV SAFE BC 20G X 1.16" (PINK)

## (undated) DEVICE — TRAP QUICK CATCH  SINGL CHAMBER

## (undated) DEVICE — DRSG CURITY GAUZE SPONGE 4 X 4" 12-PLY

## (undated) DEVICE — CATH IV SAFE BC 22G X 1" (BLUE)

## (undated) DEVICE — CATH ELECHMSTAT  INJ 7FR 210CM

## (undated) DEVICE — SYR LUER LOK 30CC

## (undated) DEVICE — BRUSH CYTO ENDO

## (undated) DEVICE — SYR LUER SLIP TIP 30CC

## (undated) DEVICE — FORCEP RADIAL JAW 4 W NDL 2.2MM 2.8MM 160CM YELLOW DISP

## (undated) DEVICE — ELCTR GROUNDING PAD ADULT COVIDIEN

## (undated) DEVICE — MASK O2 NON REBREATH 3IN1 ADULT

## (undated) DEVICE — TUBING CANNULA SALTER LABS NASAL ADULT 7FT

## (undated) DEVICE — FORMALIN CUPS 10% BUFFERED

## (undated) DEVICE — Device

## (undated) DEVICE — SET IV PUMP BLOOD 1VALVE 180FILTER NON-DEHP

## (undated) DEVICE — SOL IRR POUR H2O 500ML

## (undated) DEVICE — NDL INJ SCLERO INTERJECT 23G

## (undated) DEVICE — TUBING IV SET SECOND 34" W/O LOK-BLUNT

## (undated) DEVICE — PACK IV START WITH CHG

## (undated) DEVICE — TUBING SUCTION CONN 6FT STERILE

## (undated) DEVICE — MARKER ENDO SPOT EX

## (undated) DEVICE — STERIS DEFENDO 3-PIECE KIT (AIR/WATER, SUCTION & BIOPSY VALVES)

## (undated) DEVICE — SUCTION YANKAUER TAPERED BULBOUS NO VENT

## (undated) DEVICE — SNARE POLYP SENS 27MM 240CM